# Patient Record
Sex: FEMALE | Race: WHITE | NOT HISPANIC OR LATINO | Employment: OTHER | URBAN - METROPOLITAN AREA
[De-identification: names, ages, dates, MRNs, and addresses within clinical notes are randomized per-mention and may not be internally consistent; named-entity substitution may affect disease eponyms.]

---

## 2018-03-06 PROBLEM — Z86.0100 HISTORY OF COLON POLYPS: Status: ACTIVE | Noted: 2018-03-06

## 2018-03-06 PROBLEM — Z86.010 HISTORY OF COLON POLYPS: Status: ACTIVE | Noted: 2018-03-06

## 2018-09-18 PROBLEM — K63.5 COLON POLYP: Status: ACTIVE | Noted: 2018-09-18

## 2020-02-26 DIAGNOSIS — Z12.31 SCREENING MAMMOGRAM FOR HIGH-RISK PATIENT: Primary | ICD-10-CM

## 2020-03-10 ENCOUNTER — HOSPITAL ENCOUNTER (OUTPATIENT)
Dept: RADIOLOGY | Facility: HOSPITAL | Age: 80
Discharge: HOME | End: 2020-03-10
Attending: OBSTETRICS & GYNECOLOGY
Payer: MEDICARE

## 2020-03-10 VITALS — WEIGHT: 220.25 LBS | HEIGHT: 60 IN | BODY MASS INDEX: 43.24 KG/M2

## 2020-03-10 DIAGNOSIS — Z12.31 SCREENING MAMMOGRAM FOR HIGH-RISK PATIENT: ICD-10-CM

## 2020-03-10 PROCEDURE — 77067 SCR MAMMO BI INCL CAD: CPT | Mod: TC,PO

## 2021-02-10 DIAGNOSIS — K21.9 GASTROESOPHAGEAL REFLUX DISEASE WITHOUT ESOPHAGITIS: Primary | ICD-10-CM

## 2021-02-10 RX ORDER — OMEPRAZOLE 40 MG/1
40 CAPSULE, DELAYED RELEASE ORAL DAILY
Qty: 90 CAPSULE | Refills: 3 | Status: SHIPPED | OUTPATIENT
Start: 2021-02-10 | End: 2022-01-20

## 2021-05-06 ENCOUNTER — OFFICE VISIT (OUTPATIENT)
Dept: FAMILY MEDICINE | Facility: CLINIC | Age: 81
End: 2021-05-06
Payer: MEDICARE

## 2021-05-06 VITALS
HEART RATE: 60 BPM | BODY MASS INDEX: 43.19 KG/M2 | RESPIRATION RATE: 16 BRPM | DIASTOLIC BLOOD PRESSURE: 68 MMHG | TEMPERATURE: 98 F | OXYGEN SATURATION: 96 % | WEIGHT: 220 LBS | HEIGHT: 60 IN | SYSTOLIC BLOOD PRESSURE: 126 MMHG

## 2021-05-06 DIAGNOSIS — H40.9 GLAUCOMA OF BOTH EYES, UNSPECIFIED GLAUCOMA TYPE: ICD-10-CM

## 2021-05-06 DIAGNOSIS — H81.11 BENIGN PAROXYSMAL POSITIONAL VERTIGO OF RIGHT EAR: Primary | ICD-10-CM

## 2021-05-06 DIAGNOSIS — R35.89 POLYURIA: ICD-10-CM

## 2021-05-06 DIAGNOSIS — I10 ESSENTIAL HYPERTENSION: ICD-10-CM

## 2021-05-06 DIAGNOSIS — G47.33 OSA (OBSTRUCTIVE SLEEP APNEA): ICD-10-CM

## 2021-05-06 DIAGNOSIS — R00.1 BRADYCARDIA: ICD-10-CM

## 2021-05-06 DIAGNOSIS — H69.91 EUSTACHIAN TUBE DYSFUNCTION, RIGHT: ICD-10-CM

## 2021-05-06 LAB
BILIRUB SERPL-MCNC: NEGATIVE MG/DL
BLOOD URINE, POC: NEGATIVE
CLARITY, POC UA: ABNORMAL
COLOR, POC UA: YELLOW
GLUCOSE UR QL STRIP: NORMAL
KETONES UR QL STRIP: NEGATIVE
LEUKOCYTE ESTERASE URINE, POC: ABNORMAL
NITRITE, POC UA: NEGATIVE
PH, POC UA: 7
PROTEIN, POC: NEGATIVE
SPECIFIC GRAVITY, POC UA: 1
UROBILINOGEN, POC UA: NEGATIVE

## 2021-05-06 PROCEDURE — 99213 PR OFFICE/OUTPT VISIT, EST, LEVL III, 20-29 MIN: ICD-10-PCS | Mod: 25,S$GLB,ICN, | Performed by: INTERNAL MEDICINE

## 2021-05-06 PROCEDURE — 99213 OFFICE O/P EST LOW 20 MIN: CPT | Mod: 25,S$GLB,ICN, | Performed by: INTERNAL MEDICINE

## 2021-05-06 PROCEDURE — 81002 URINALYSIS NONAUTO W/O SCOPE: CPT | Mod: S$GLB,ICN,, | Performed by: INTERNAL MEDICINE

## 2021-05-06 PROCEDURE — 81002 POCT URINE DIPSTICK WITHOUT MICROSCOPE: ICD-10-PCS | Mod: S$GLB,ICN,, | Performed by: INTERNAL MEDICINE

## 2021-05-06 RX ORDER — BIMATOPROST 0.1 MG/ML
SOLUTION/ DROPS OPHTHALMIC
COMMUNITY
Start: 2021-02-03

## 2021-05-06 RX ORDER — METOPROLOL SUCCINATE 50 MG/1
50 TABLET, EXTENDED RELEASE ORAL NIGHTLY
Qty: 90 TABLET | Refills: 3 | Status: SHIPPED | OUTPATIENT
Start: 2021-05-06

## 2021-05-06 RX ORDER — VALSARTAN AND HYDROCHLOROTHIAZIDE 320; 25 MG/1; MG/1
1 TABLET, FILM COATED ORAL DAILY
COMMUNITY
End: 2023-02-28 | Stop reason: SDUPTHER

## 2022-07-11 LAB
CHOLEST SERPL-MSCNC: 172 MG/DL (ref 0–200)
HDLC SERPL-MCNC: 48 MG/DL
LDLC SERPL CALC-MCNC: 97 MG/DL (ref 0–160)
TRIGL SERPL-MCNC: 134 MG/DL (ref 40–160)

## 2022-08-04 ENCOUNTER — TELEPHONE (OUTPATIENT)
Dept: FAMILY MEDICINE | Facility: CLINIC | Age: 82
End: 2022-08-04
Payer: MEDICARE

## 2022-08-15 ENCOUNTER — OFFICE VISIT (OUTPATIENT)
Dept: FAMILY MEDICINE | Facility: CLINIC | Age: 82
End: 2022-08-15
Payer: MEDICARE

## 2022-08-15 VITALS
OXYGEN SATURATION: 95 % | SYSTOLIC BLOOD PRESSURE: 134 MMHG | BODY MASS INDEX: 44.76 KG/M2 | HEART RATE: 59 BPM | DIASTOLIC BLOOD PRESSURE: 70 MMHG | HEIGHT: 60 IN | WEIGHT: 228 LBS | TEMPERATURE: 99 F

## 2022-08-15 DIAGNOSIS — H90.3 SENSORINEURAL HEARING LOSS (SNHL) OF BOTH EARS: ICD-10-CM

## 2022-08-15 DIAGNOSIS — Z12.31 SCREENING MAMMOGRAM FOR HIGH-RISK PATIENT: ICD-10-CM

## 2022-08-15 DIAGNOSIS — E03.9 HYPOTHYROIDISM, UNSPECIFIED TYPE: Primary | ICD-10-CM

## 2022-08-15 DIAGNOSIS — G47.33 OSA (OBSTRUCTIVE SLEEP APNEA): ICD-10-CM

## 2022-08-15 DIAGNOSIS — I10 ESSENTIAL HYPERTENSION: ICD-10-CM

## 2022-08-15 DIAGNOSIS — K21.9 GASTROESOPHAGEAL REFLUX DISEASE WITHOUT ESOPHAGITIS: ICD-10-CM

## 2022-08-15 DIAGNOSIS — R00.1 BRADYCARDIA: ICD-10-CM

## 2022-08-15 DIAGNOSIS — H40.9 GLAUCOMA OF RIGHT EYE, UNSPECIFIED GLAUCOMA TYPE: ICD-10-CM

## 2022-08-15 PROCEDURE — 99215 OFFICE O/P EST HI 40 MIN: CPT | Mod: S$GLB,,, | Performed by: INTERNAL MEDICINE

## 2022-08-15 PROCEDURE — 99215 PR OFFICE/OUTPT VISIT, EST, LEVL V, 40-54 MIN: ICD-10-PCS | Mod: S$GLB,,, | Performed by: INTERNAL MEDICINE

## 2022-08-15 RX ORDER — OMEPRAZOLE 40 MG/1
40 CAPSULE, DELAYED RELEASE ORAL DAILY
Qty: 90 CAPSULE | Refills: 3 | Status: SHIPPED | OUTPATIENT
Start: 2022-08-15

## 2022-08-15 RX ORDER — DOXYCYCLINE 100 MG/1
100 CAPSULE ORAL 2 TIMES DAILY
COMMUNITY
Start: 2022-08-04 | End: 2022-08-15

## 2022-08-15 RX ORDER — KETOROLAC TROMETHAMINE 5 MG/ML
SOLUTION OPHTHALMIC
COMMUNITY
Start: 2022-02-22 | End: 2023-02-28

## 2022-08-15 RX ORDER — LEVOTHYROXINE SODIUM 88 UG/1
TABLET ORAL
Qty: 90 TABLET | Refills: 3 | Status: SHIPPED | OUTPATIENT
Start: 2022-08-15 | End: 2023-02-28 | Stop reason: SDUPTHER

## 2022-08-15 NOTE — Clinical Note
Patient had her lipid panel by her cardiologist Dr. Becerra on July 11th 2022 at [facility]

## 2022-08-15 NOTE — PROGRESS NOTES
Subjective:       Patient ID: Rose Vasquez is a 81 y.o. female.    Chief Complaint: Follow-up (Patient is here for a follow up and had labs performed on 7/11/22 with [facility]; Available for review per pt request from the office of Dr. Becerra. Pt has not been sleeping at night and CPAP has had a recall on it. Pt feels she is dozing off during the day and not resting during the night and would like advisement on a new order and procedure going forward. ) and PES - Care Gap (Pt states she had a shingles vaccine a little over a year ago; TDAP is unknown and Pneumococcal Vaccine is due. )    Patient presents today in the company of her daughter for review of lab work ordered by her cardiologist Dr. Becerra on July 11, 2022. Reports are available at time of exam will be discussed with the patient at length.    Regarding her CBC was unremarkable with a white count of 5100 hemoglobin of 13.9 hematocrit of 40.6 MCV of 92 and 179,000 platelets.    Thyroid function testing was also performed with a TSH of 3.76 with a free T4 of 1.28.  These are both normal values.  Patient is compliant and tolerating her Synthroid 88 mcg 1 every day.  She gets these male through the try care prescription program and there is no reason to change it at this time.  This lab work can be repeated in 1 year    Under comprehensive metabolic panel sodium was normal at 139 potassium 3.7 chloride 100 bicarb of 31.  BUN of 14 creatinine 0.75 gives her calculated glomerular filtration rate for non- Americ over 60 cc/minute.  Fast Glucose was 105 with a calcium of 10.2 and albumin of 4.0.  This is elevated by just 0.2 and is not considered to be clinically significant.  Liver function testing AST and ALT were equal to less than 20 with the total bilirubin of 0.5 an alkaline phosphatase of 86.    Fasting total cholesterol was 172 with an HDL 48 LDL of 97 and triglycerides of 134. Patient does not warrant starting statin therapy for lifetime at this  time.    Magnesium level was unremarkable at 2.1 with a high normal of 2.3 low normal 1.6.    Regarding the care gaps hepatitis-C screening can be obtained at a later date.  Patient has had 2 doses of the COVID vaccine and no booster as of now.  She is interested in receiving the seasonal influenza vaccination when available and have instructed her to call us in the next 3-4 weeks to be able to be placed on the schedule.    Patient's CPAP equipment for his sleep apnea is part of the recall and unfortunately they have not being able to catch up with the demand for the filters.  Being that her sleep study was many years ago patient warrants a restudy and readjustment of the pressures.  I will refer her back to Dr. Lubin for further evaluation and therapy.  They will call her from his office for the final scheduling details.    She is up-to-date with follow-up for her glaucoma therapy.  This is affecting her right eye only at this time.  Medication was modified per my recommendation to discontinue the Timoptic being that she was also on oral beta-blocker.  She is still having heart rates in the mid 50s to low 60s.  Patient is asymptomatic from this.        Review of Systems   All other systems reviewed and are negative.        Objective:      Physical Exam  Vitals and nursing note reviewed. Exam conducted with a chaperone present.   Constitutional:       General: She is not in acute distress.     Appearance: Normal appearance. She is obese. She is not ill-appearing, toxic-appearing or diaphoretic.   HENT:      Head: Normocephalic and atraumatic.   Cardiovascular:      Rate and Rhythm: Regular rhythm. Bradycardia present.      Pulses: Normal pulses.      Heart sounds: Normal heart sounds. No murmur heard.  Pulmonary:      Effort: Pulmonary effort is normal.      Breath sounds: Normal breath sounds.   Skin:     General: Skin is warm and dry.      Coloration: Skin is not jaundiced or pale.   Neurological:      General:  No focal deficit present.      Mental Status: She is alert and oriented to person, place, and time. Mental status is at baseline.      Cranial Nerves: No cranial nerve deficit.      Sensory: Sensory deficit present.      Motor: No weakness.      Gait: Gait normal.      Comments: Bilateral hearing aids.  Sensorineural hearing loss.   Psychiatric:         Mood and Affect: Mood normal.         Behavior: Behavior normal.         Thought Content: Thought content normal.         Judgment: Judgment normal.         Assessment:       Problem List Items Addressed This Visit    None     Visit Diagnoses     Hypothyroidism, unspecified type    -  Primary    Gastroesophageal reflux disease without esophagitis        ANGELA (obstructive sleep apnea)        Glaucoma of right eye, unspecified glaucoma type        Essential hypertension        Bradycardia        Sensorineural hearing loss (SNHL) of both ears              Plan:       Based on this lab work from July 11, 2022 patient does not have any pressing medical issues.  She is to continue follow-up with Cardiology as now.    She can get a 2nd opinion regarding the function of her hearing aids which she is not satisfied with.    Patient is to call the office in the next 3-4 weeks regarding the availability of the seasonal influenza vaccination.    At the time of the next lab work she can have hepatitis-C screen.    Patient is to continue close follow-up with her optometrist regarding her glaucoma management.  She is still having some bradycardia most probably related now just to the Toprol and not to the combination of the Toprol and the team optic.    I recommended for the daughter to use the patient's cellphone to take pictures of all the medications including her eye drops so that she can have it available at time of visit.    Other care gaps are satisfied include the lipid panel that was already performed in July.    No vaccines are otherwise pending and is up to the patient to  receive COVID booster is or not.    Patient will be scheduled for mammography at the [facility] and they will call with regards to final scheduling details.    Pt is well aware of the signs and symptoms to watch for and to seek medical attention in case these appear

## 2022-08-17 DIAGNOSIS — I10 ESSENTIAL HYPERTENSION: ICD-10-CM

## 2022-08-18 ENCOUNTER — PATIENT OUTREACH (OUTPATIENT)
Dept: ADMINISTRATIVE | Facility: HOSPITAL | Age: 82
End: 2022-08-18
Payer: MEDICARE

## 2022-09-09 ENCOUNTER — HOSPITAL ENCOUNTER (OUTPATIENT)
Dept: RADIOLOGY | Facility: HOSPITAL | Age: 82
Discharge: HOME | End: 2022-09-09
Attending: INTERNAL MEDICINE
Payer: MEDICARE

## 2022-09-09 VITALS — BODY MASS INDEX: 44.75 KG/M2 | HEIGHT: 60 IN | WEIGHT: 227.94 LBS

## 2022-09-09 DIAGNOSIS — Z12.31 SCREENING MAMMOGRAM FOR HIGH-RISK PATIENT: ICD-10-CM

## 2022-09-09 PROCEDURE — 77067 SCR MAMMO BI INCL CAD: CPT | Mod: TC,PO

## 2022-11-30 ENCOUNTER — CLINICAL SUPPORT (OUTPATIENT)
Dept: FAMILY MEDICINE | Facility: CLINIC | Age: 82
End: 2022-11-30
Payer: MEDICARE

## 2022-11-30 DIAGNOSIS — Z23 FLU VACCINE NEED: Primary | ICD-10-CM

## 2022-11-30 PROCEDURE — 90686 IIV4 VACC NO PRSV 0.5 ML IM: CPT | Mod: S$GLB,,, | Performed by: INTERNAL MEDICINE

## 2022-11-30 PROCEDURE — G0008 ADMIN INFLUENZA VIRUS VAC: HCPCS | Mod: S$GLB,,, | Performed by: INTERNAL MEDICINE

## 2022-11-30 PROCEDURE — G0008 FLU VACCINE (QUAD) GREATER THAN OR EQUAL TO 3YO PRESERVATIVE FREE IM: ICD-10-PCS | Mod: S$GLB,,, | Performed by: INTERNAL MEDICINE

## 2022-11-30 PROCEDURE — 90686 FLU VACCINE (QUAD) GREATER THAN OR EQUAL TO 3YO PRESERVATIVE FREE IM: ICD-10-PCS | Mod: S$GLB,,, | Performed by: INTERNAL MEDICINE

## 2022-11-30 NOTE — PROGRESS NOTES
Patient seen today as a nurse visit for flu vaccine. Injection given IM to left deltoid.  Patient tolerated procedure well. YOVANI Mcgarry

## 2023-02-28 ENCOUNTER — OFFICE VISIT (OUTPATIENT)
Dept: FAMILY MEDICINE | Facility: CLINIC | Age: 83
End: 2023-02-28
Payer: MEDICARE

## 2023-02-28 VITALS
HEIGHT: 60 IN | DIASTOLIC BLOOD PRESSURE: 80 MMHG | OXYGEN SATURATION: 96 % | SYSTOLIC BLOOD PRESSURE: 128 MMHG | BODY MASS INDEX: 44.15 KG/M2 | HEART RATE: 72 BPM | WEIGHT: 224.88 LBS | RESPIRATION RATE: 18 BRPM

## 2023-02-28 DIAGNOSIS — G47.33 OSA (OBSTRUCTIVE SLEEP APNEA): ICD-10-CM

## 2023-02-28 DIAGNOSIS — H40.9 GLAUCOMA OF BOTH EYES, UNSPECIFIED GLAUCOMA TYPE: ICD-10-CM

## 2023-02-28 DIAGNOSIS — E03.9 HYPOTHYROIDISM, UNSPECIFIED TYPE: ICD-10-CM

## 2023-02-28 DIAGNOSIS — I10 ESSENTIAL HYPERTENSION: Primary | ICD-10-CM

## 2023-02-28 DIAGNOSIS — E66.2 CLASS 3 OBESITY WITH ALVEOLAR HYPOVENTILATION, SERIOUS COMORBIDITY, AND BODY MASS INDEX (BMI) OF 40.0 TO 44.9 IN ADULT: ICD-10-CM

## 2023-02-28 DIAGNOSIS — H81.11 BENIGN PAROXYSMAL POSITIONAL VERTIGO OF RIGHT EAR: ICD-10-CM

## 2023-02-28 DIAGNOSIS — R79.89 AZOTEMIA: ICD-10-CM

## 2023-02-28 LAB
ANION GAP SERPL CALC-SCNC: 11 MMOL/L (ref 8–16)
BUN SERPL-MCNC: 15 MG/DL (ref 8–23)
CALCIUM SERPL-MCNC: 9.4 MG/DL (ref 8.7–10.5)
CHLORIDE SERPL-SCNC: 104 MMOL/L (ref 95–110)
CO2 SERPL-SCNC: 27 MMOL/L (ref 23–29)
CREAT SERPL-MCNC: 0.8 MG/DL (ref 0.5–1.4)
EST. GFR  (NO RACE VARIABLE): >60 ML/MIN/1.73 M^2
GLUCOSE SERPL-MCNC: 83 MG/DL (ref 70–110)
POTASSIUM SERPL-SCNC: 4 MMOL/L (ref 3.5–5.1)
SODIUM SERPL-SCNC: 142 MMOL/L (ref 136–145)

## 2023-02-28 PROCEDURE — 99213 OFFICE O/P EST LOW 20 MIN: CPT | Mod: S$GLB,,, | Performed by: INTERNAL MEDICINE

## 2023-02-28 PROCEDURE — 80048 BASIC METABOLIC PNL TOTAL CA: CPT | Performed by: INTERNAL MEDICINE

## 2023-02-28 PROCEDURE — 99213 PR OFFICE/OUTPT VISIT, EST, LEVL III, 20-29 MIN: ICD-10-PCS | Mod: S$GLB,,, | Performed by: INTERNAL MEDICINE

## 2023-02-28 RX ORDER — VALSARTAN AND HYDROCHLOROTHIAZIDE 320; 25 MG/1; MG/1
1 TABLET, FILM COATED ORAL DAILY
Qty: 90 TABLET | Refills: 3 | Status: SHIPPED | OUTPATIENT
Start: 2023-02-28

## 2023-02-28 RX ORDER — LEVOTHYROXINE SODIUM 88 UG/1
TABLET ORAL
Qty: 90 TABLET | Refills: 3 | Status: SHIPPED | OUTPATIENT
Start: 2023-02-28

## 2023-02-28 NOTE — PROGRESS NOTES
Subjective:       Patient ID: Rose Vasquez is a 82 y.o. female.    Chief Complaint: Follow-up (Pt presents to the clinic for a medication refill and 5m f/u)    Presents in the company of her daughter who is the primary caregiver for follow-up of her hypertension medication refills.    She is currently on valsartan HCTZ 320-25 with appropriate blood pressure control.  As are for this to be refilled good for 90 day supply with 3 refills.      Also her levothyroxine at 88 mcg every day had a normal TSH back in July of 2022 so he also will be refilled good for a year.      She continues treatment for glaucoma and is currently only on Lumigan.  Intra-ocular pressures are appropriate at the time of the last checkup.      Patient prefers not to have another DEXA scan.      No other care gaps pending.      Pressure today is appropriate 128/80 and she is in normal sinus rhythm in the mid 70s.      Regarding an issue on her waking up from a nap on a recliner without wearing her CPAP and feeling that she had difficulty waking up, is not for patient to have severe obstructive sleep apnea and sleeping without the CPAP my actually have had something to do with that.  I recommended for her not to nap on a recliner and only do so on a bed and always with the CPAP on.      She has already undergone a complete cardiology workup and there were no abnormalities identified at the time.  This included a stress test and an echocardiogram.  I surmise that there was no changes suggestive for critical coronary stenosis or changes of valvular heart disease of pulmonary hypertension.      As it pertains to the poor stamina it appears to be more related to deconditioning than to any active medical illness.    Review of Systems   All other systems reviewed and are negative.      Objective:      Physical Exam  Vitals and nursing note reviewed. Exam conducted with a chaperone present.   Constitutional:       Appearance: Normal appearance. She  is obese.   Cardiovascular:      Rate and Rhythm: Normal rate and regular rhythm.      Pulses: Normal pulses.      Heart sounds: Normal heart sounds. No murmur heard.    No gallop.   Pulmonary:      Effort: Pulmonary effort is normal. No respiratory distress.      Breath sounds: Normal breath sounds. No wheezing.   Chest:      Chest wall: No tenderness.   Skin:     General: Skin is warm and dry.      Coloration: Skin is not jaundiced or pale.   Neurological:      General: No focal deficit present.      Mental Status: She is alert and oriented to person, place, and time. Mental status is at baseline.      Cranial Nerves: No cranial nerve deficit.      Sensory: No sensory deficit.      Motor: No weakness.      Coordination: Coordination normal.      Gait: Gait normal.   Psychiatric:         Mood and Affect: Mood normal.         Behavior: Behavior normal.         Thought Content: Thought content normal.         Judgment: Judgment normal.       Assessment:       Problem List Items Addressed This Visit    None  Visit Diagnoses       Essential hypertension    -  Primary    Hypothyroidism, unspecified type        Azotemia        ANGELA (obstructive sleep apnea)        Benign paroxysmal positional vertigo of right ear        Glaucoma of both eyes, unspecified glaucoma type        Class 3 obesity with alveolar hypoventilation, serious comorbidity, and body mass index (BMI) of 40.0 to 44.9 in adult                  Plan:       Patient's valsartan hydrochlorothiazide 320-25 will be replenished now.  Be good for a year.  Blood pressure is controlled with current therapy.      Her hypothyroidism management with the levothyroxine 88 mcg every day will also be refilled good for a year.      Basic metabolic panel is to be drawn today to follow-up on electrolytes and renal function since he has not been done since July of 2022.      She is up-to-date with follow-up of hypothyroidism with normal TSH in July of 2022.      Glaucoma is  controlled with monotherapy with Lumigan according to the patient.      The daughter is to set of the portal in the way that she can get all the patient's messages and have access to her privileged medical information with the patient's permission.

## 2023-10-12 DIAGNOSIS — Z12.31 ENCOUNTER FOR SCREENING MAMMOGRAM FOR MALIGNANT NEOPLASM OF BREAST: Primary | ICD-10-CM

## 2023-12-04 ENCOUNTER — HOSPITAL ENCOUNTER (OUTPATIENT)
Dept: RADIOLOGY | Facility: HOSPITAL | Age: 83
Discharge: HOME | End: 2023-12-04
Attending: INTERNAL MEDICINE
Payer: MEDICARE

## 2023-12-04 DIAGNOSIS — Z12.31 ENCOUNTER FOR SCREENING MAMMOGRAM FOR MALIGNANT NEOPLASM OF BREAST: ICD-10-CM

## 2023-12-04 PROCEDURE — 77067 SCR MAMMO BI INCL CAD: CPT | Mod: TC,PO

## 2023-12-29 ENCOUNTER — OFFICE VISIT (OUTPATIENT)
Dept: HEMATOLOGY/ONCOLOGY | Facility: CLINIC | Age: 83
End: 2023-12-29
Payer: MEDICARE

## 2023-12-29 VITALS
WEIGHT: 220.88 LBS | DIASTOLIC BLOOD PRESSURE: 78 MMHG | HEART RATE: 59 BPM | SYSTOLIC BLOOD PRESSURE: 131 MMHG | RESPIRATION RATE: 16 BRPM | TEMPERATURE: 97 F | BODY MASS INDEX: 43.14 KG/M2

## 2023-12-29 DIAGNOSIS — I82.401 LEG DVT (DEEP VENOUS THROMBOEMBOLISM), ACUTE, RIGHT: ICD-10-CM

## 2023-12-29 DIAGNOSIS — I26.09 OTHER ACUTE PULMONARY EMBOLISM WITH ACUTE COR PULMONALE: Primary | ICD-10-CM

## 2023-12-29 PROCEDURE — 99204 OFFICE O/P NEW MOD 45 MIN: CPT | Mod: S$GLB,,, | Performed by: INTERNAL MEDICINE

## 2023-12-29 PROCEDURE — 99204 PR OFFICE/OUTPT VISIT, NEW, LEVL IV, 45-59 MIN: ICD-10-PCS | Mod: S$GLB,,, | Performed by: INTERNAL MEDICINE

## 2023-12-29 RX ORDER — APIXABAN 5 MG/1
5 TABLET, FILM COATED ORAL 2 TIMES DAILY
COMMUNITY
Start: 2023-12-04

## 2023-12-31 NOTE — PROGRESS NOTES
[facility] hematology Oncology in office initial encounter note     2023    Subjective:      Patient ID:   Rose Vasquez  83 y.o. female  1940  MD Josephine Roldan NP      Chief Complaint:   Pulmonary embolus     HPI:  83 y.o. female traveled with her family by automobile in 2022 you very for vacation in [place].  Around late November, she developed pain and swelling in her right leg for approximately 1 week.  She was seen in the emergency room at [facility] and found to have DVT at the right leg and multiple pulmonary emboli.  She was transferred to [facility], treated with heparin infusion and transitioned over to Eliquis.  She is presently on Eliquis 5 mg b.i.d..  She denies shortness of breath or chest pain symptoms or pleurisy.  She does have some residual pain and swelling in the right leg now.    She does have history of taking COVID vaccinations in , and had COVID infection approximately 1 year ago.    She has glaucoma, hypertension x2 years, sleep apnea syndrome and uses a CPAP machine, she has thyroid disease.    Medications include Synthroid Toprol-XL, Prilosec, Diovan HCT and vitamin-D, vitamin-C    She is allergic to methylprednisolone and or lidocaine.    She does not smoke, she does not drink alcohol.    She is status post left rotator cuff surgery at the shoulder, and status post cholecystectomy.    She is a  4 para 3 miscarriage 1 individual.    Her mother had colon cancer, CVA x1, she had 1 kidney removed.  Her father had lung cancer, diabetes, he was a mattson.  A brother  of unknown cause, another brother had diabetes, hypertension, renal disease.  She had 2 sisters alive and well.  She has 2 daughters alive and well and 1 son with testicular cancer history at age 29 treated with surgery and radiation.  There is no family history of DVT, or pulmonary emboli events.    ROS:   GEN: normal without any fever, night  sweats or weight loss  HEENT: normal with no HA's, sore throat, stiff neck, changes in vision  CV: normal with no CP, SOB, PND, TOURE or orthopnea  PULM: See HPI  GI: normal with no abdominal pain, nausea, vomiting, constipation, diarrhea, melanotic stools, BRBPR, or hematemesis  : normal with no hematuria, dysuria  BREAST: normal with no mass, discharge, pain  SKIN: normal with no rash, erythema, bruising, or swelling     Past Medical History:   Diagnosis Date    Glaucoma     BL    Hypertension     Sleep apnea with use of continuous positive airway pressure (CPAP)     Thyroid disease      Past Surgical History:   Procedure Laterality Date    BREAST SURGERY      lump removed     CHOLECYSTECTOMY      COLONOSCOPY N/A 3/6/2018    Procedure: COLONOSCOPY;  Surgeon: Gamal Shelley MD;  Location: [facility];  Service: Endoscopy;  Laterality: N/A;    COLONOSCOPY N/A 9/18/2018    Procedure: COLONOSCOPY;  Surgeon: Gamal Shelley MD;  Location: [facility];  Service: Endoscopy;  Laterality: N/A;    COLONOSCOPY N/A 9/3/2019    Procedure: COLONOSCOPY;  Surgeon: Gamal Shelley MD;  Location: [facility];  Service: Endoscopy;  Laterality: N/A;    ENDOSCOPIC MUCOSAL RESECTION OF COLON N/A 9/18/2018    Procedure: RESECTION, MUCOSA, COLON, ENDOSCOPIC;  Surgeon: Gamal Shelley MD;  Location: [facility];  Service: Endoscopy;  Laterality: N/A;  CF SCOPE WITH CLEAR CAP    ENDOSCOPIC MUCOSAL RESECTION OF COLON N/A 9/3/2019    Procedure: RESECTION, MUCOSA, COLON, ENDOSCOPIC;  Surgeon: Gamal Shelley MD;  Location: [facility];  Service: Endoscopy;  Laterality: N/A;  CF with clear cap       Review of patient's allergies indicates:   Allergen Reactions    Depo-medrol [methylprednisolone acetate] Other (See Comments)     Swelling in throat after mixture of dep-medrol and lidocaine IM injection    Lidocaine Other (See Comments)     Swelling in throat after mixture of dep-medrol and lidocaine IM injection     Methylprednisolone Other (See Comments)     Swelling in throat after mixture of dep-medrol and lidocaine IM injection     Social History     Socioeconomic History    Marital status:    Tobacco Use    Smoking status: Never    Smokeless tobacco: Never   Substance and Sexual Activity    Alcohol use: No         Current Outpatient Medications:     ascorbic acid, vitamin C, 550 mg/1.1 gram (scoop) Powd, Take by mouth., Disp: , Rfl:     bimatoprost (LUMIGAN) 0.01 % Drop, Apply 1 drop to eye every evening., Disp: , Rfl:     ELIQUIS 5 mg Tab, Take 5 mg by mouth 2 (two) times a day., Disp: , Rfl:     ergocalciferol (ERGOCALCIFEROL) 50,000 unit Cap, Take 2,000 Units by mouth every 7 days., Disp: , Rfl:     ergocalciferol (ERGOCALCIFEROL) 50,000 unit Cap, Take 50,000 Units by mouth., Disp: , Rfl:     levothyroxine (SYNTHROID) 88 MCG tablet, TAKE 1 TABLET EVERY MORNING ONE HOUR BEFORE MEAL AND OTHER MEDICATIONS., Disp: 90 tablet, Rfl: 3    LUMIGAN 0.01 % Drop, , Disp: , Rfl:     metoprolol succinate (TOPROL-XL) 50 MG 24 hr tablet, Take 1 tablet (50 mg total) by mouth every evening., Disp: 90 tablet, Rfl: 3    omeprazole (PRILOSEC) 40 MG capsule, Take 1 capsule (40 mg total) by mouth once daily., Disp: 90 capsule, Rfl: 3    valsartan-hydrochlorothiazide (DIOVAN-HCT) 320-25 mg per tablet, Take 1 tablet by mouth once daily., Disp: 90 tablet, Rfl: 3          Objective:   Vitals:  Blood pressure 131/78, pulse (!) 59, temperature 97.2 °F (36.2 °C), resp. rate 16, weight 100.2 kg (220 lb 14.4 oz).    Physical Examination:   GEN: no apparent distress, comfortable  HEAD: atraumatic and normocephalic  EYES: no pallor, no icterus  ENT: no pharyngeal erythema, external ears WNL; no nasal discharge  NECK: no masses, thyroid normal, trachea midline, no LAD/LN's, supple  CV: RRR with no murmur; normal pulse  CHEST: Normal respiratory effort; CTAB; normal breath sounds; no wheeze or crackles  ABDOM: nontender and nondistended; soft;  no  "rebound/guarding, L/S NP  MUSC/Skeletal: ROM normal; no crepitus; joints normal  EXTREM:  Right leg is nontender without palpable venous cord but does have 1+ pitting edema.  Left leg is nontender without palpable venous cord, without pitting edema.  Venous stasis changes not seen at the left or right leg.  SKIN: no rashes, lesions, ulcers, petechiae   : no CVAT  NEURO: grossly intact; motor/sensory WNL;  no tremors  PSYCH: normal mood, affect and behavior  LYMPH: normal cervical, supraclavicular, axillary LN's  BREASTS:she left bra on for exam.      Labs:   No results found for: "WBC", "HGB", "HCT", "MCV", "PLT" CMP  Sodium   Date Value Ref Range Status   02/28/2023 142 136 - 145 mmol/L Final     Potassium   Date Value Ref Range Status   02/28/2023 4.0 3.5 - 5.1 mmol/L Final     Chloride   Date Value Ref Range Status   02/28/2023 104 95 - 110 mmol/L Final     CO2   Date Value Ref Range Status   02/28/2023 27 23 - 29 mmol/L Final     Glucose   Date Value Ref Range Status   02/28/2023 83 70 - 110 mg/dL Final     BUN   Date Value Ref Range Status   02/28/2023 15 8 - 23 mg/dL Final     Creatinine   Date Value Ref Range Status   02/28/2023 0.8 0.5 - 1.4 mg/dL Final     Calcium   Date Value Ref Range Status   02/28/2023 9.4 8.7 - 10.5 mg/dL Final     Anion Gap   Date Value Ref Range Status   02/28/2023 11 8 - 16 mmol/L Final     Recent CBC, CMP, TSH unremarkable.    12/4/23 mamm negative for malignancy    CTA multiple bilateral pulmonary emboli with right heart strain.    U/S thrombosis seen in superficial femoral vein and popliteal vein of the right leg, November 16, 2023    Assessment:   (1) 83 y.o. female with diagnosis of right leg DVT and bilateral multiple pulmonary emboli with right heart strain.  Presently clinically improved on Eliquis 5 mg p.o. b.i.d..  She will continue Eliquis for 4-6 months.  She will follow-up here in 3 months.  CT scan of the chest and ultrasound of the right leg will be done in 4 months " to check on the resolution of clot.    (2) At some point in the future, after pulmonary emboli have resolved and after right leg DVT has resolved, the Eliquis will be interrupted and lab studies to check for possible hypercoagulation syndrome will be done.  At that time depending on results, we will make a decision on stopping or continuing Eliquis as prophylaxis for DVT.    Return to clinic here in 3 months.

## 2024-04-01 ENCOUNTER — OFFICE VISIT (OUTPATIENT)
Dept: HEMATOLOGY/ONCOLOGY | Facility: CLINIC | Age: 84
End: 2024-04-01
Payer: MEDICARE

## 2024-04-01 VITALS
TEMPERATURE: 98 F | DIASTOLIC BLOOD PRESSURE: 79 MMHG | RESPIRATION RATE: 16 BRPM | WEIGHT: 236.38 LBS | HEART RATE: 63 BPM | SYSTOLIC BLOOD PRESSURE: 140 MMHG | BODY MASS INDEX: 46.17 KG/M2

## 2024-04-01 DIAGNOSIS — I26.99 ACUTE PULMONARY EMBOLISM WITHOUT ACUTE COR PULMONALE, UNSPECIFIED PULMONARY EMBOLISM TYPE: Primary | ICD-10-CM

## 2024-04-01 DIAGNOSIS — I82.411 ACUTE DEEP VEIN THROMBOSIS (DVT) OF FEMORAL VEIN OF RIGHT LOWER EXTREMITY: ICD-10-CM

## 2024-04-01 PROCEDURE — 99214 OFFICE O/P EST MOD 30 MIN: CPT | Mod: S$GLB,,, | Performed by: INTERNAL MEDICINE

## 2024-04-01 NOTE — PROGRESS NOTES
[facility] hematology Oncology in office Subsequent encounter note     24    Subjective:      Patient ID:   Rose Vasquez  83 y.o. female  1940  MD Josephine Roldan NP      Chief Complaint:   Pulmonary embolus     HPI:  83 y.o. female traveled with her family by automobile in 2023 you very for vacation in [place].  Around 23, she developed pain and swelling in her right leg for approximately 1 week.  She was seen in the emergency room at [facility] and found to have DVT at the right leg and multiple pulmonary emboli.  She was transferred to [facility], treated with heparin infusion and transitioned over to Eliquis.  She is presently on Eliquis 5 mg b.i.d..  She denies shortness of breath or chest pain symptoms or pleurisy.  She does have some residual pain and swelling in the right leg now and then.    She does have history of taking COVID vaccinations in , and had COVID infection approximately 1 year ago.    She has glaucoma, hypertension x2 years, sleep apnea syndrome and uses a CPAP machine, she has thyroid disease.    Medications include Synthroid Toprol-XL, Prilosec, Diovan HCT and vitamin-D, vitamin-C    She is allergic to methylprednisolone and or lidocaine.    She does not smoke, she does not drink alcohol.    She is status post left rotator cuff surgery at the shoulder, and status post cholecystectomy.    She is a  4 para 3 miscarriage 1 individual.    Her mother had colon cancer, CVA x1, she had 1 kidney removed.  Her father had lung cancer, diabetes, he was a mattson.  A brother  of unknown cause, another brother had diabetes, hypertension, renal disease.  She had 2 sisters alive and well.  She has 2 daughters alive and well and 1 son with testicular cancer history at age 29 treated with surgery and radiation.  There is no family history of DVT, or pulmonary emboli events.    ROS:   GEN: normal without any fever, night sweats  or weight loss  HEENT: normal with no HA's, sore throat, stiff neck, changes in vision  CV: normal with no CP, SOB, PND, TOURE or orthopnea  PULM: See HPI  GI: normal with no abdominal pain, nausea, vomiting, constipation, diarrhea, melanotic stools, BRBPR, or hematemesis  : normal with no hematuria, dysuria  BREAST: normal with no mass, discharge, pain  SKIN: normal with no rash, erythema, bruising, or swelling     Past Medical History:   Diagnosis Date    Glaucoma     BL    Hypertension     Sleep apnea with use of continuous positive airway pressure (CPAP)     Thyroid disease      Past Surgical History:   Procedure Laterality Date    BREAST SURGERY      lump removed     CHOLECYSTECTOMY      COLONOSCOPY N/A 3/6/2018    Procedure: COLONOSCOPY;  Surgeon: Gamal Shelley MD;  Location: [facility];  Service: Endoscopy;  Laterality: N/A;    COLONOSCOPY N/A 9/18/2018    Procedure: COLONOSCOPY;  Surgeon: Gamal Shelley MD;  Location: [facility];  Service: Endoscopy;  Laterality: N/A;    COLONOSCOPY N/A 9/3/2019    Procedure: COLONOSCOPY;  Surgeon: Gamal Shelley MD;  Location: [facility];  Service: Endoscopy;  Laterality: N/A;    ENDOSCOPIC MUCOSAL RESECTION OF COLON N/A 9/18/2018    Procedure: RESECTION, MUCOSA, COLON, ENDOSCOPIC;  Surgeon: Gaaml Shelley MD;  Location: [facility];  Service: Endoscopy;  Laterality: N/A;  CF SCOPE WITH CLEAR CAP    ENDOSCOPIC MUCOSAL RESECTION OF COLON N/A 9/3/2019    Procedure: RESECTION, MUCOSA, COLON, ENDOSCOPIC;  Surgeon: Gamal Shelley MD;  Location: [facility];  Service: Endoscopy;  Laterality: N/A;  CF with clear cap       Review of patient's allergies indicates:   Allergen Reactions    Depo-medrol [methylprednisolone acetate] Other (See Comments)     Swelling in throat after mixture of dep-medrol and lidocaine IM injection    Lidocaine Other (See Comments)     Swelling in throat after mixture of dep-medrol and lidocaine IM injection    Methylprednisolone  Other (See Comments)     Swelling in throat after mixture of dep-medrol and lidocaine IM injection     Social History     Socioeconomic History    Marital status:    Tobacco Use    Smoking status: Never    Smokeless tobacco: Never   Substance and Sexual Activity    Alcohol use: No         Current Outpatient Medications:     ascorbic acid, vitamin C, 550 mg/1.1 gram (scoop) Powd, Take by mouth., Disp: , Rfl:     bimatoprost (LUMIGAN) 0.01 % Drop, Apply 1 drop to eye every evening., Disp: , Rfl:     ELIQUIS 5 mg Tab, Take 5 mg by mouth 2 (two) times a day., Disp: , Rfl:     ergocalciferol (ERGOCALCIFEROL) 50,000 unit Cap, Take 2,000 Units by mouth every 7 days., Disp: , Rfl:     ergocalciferol (ERGOCALCIFEROL) 50,000 unit Cap, Take 50,000 Units by mouth., Disp: , Rfl:     levothyroxine (SYNTHROID) 88 MCG tablet, TAKE 1 TABLET EVERY MORNING ONE HOUR BEFORE MEAL AND OTHER MEDICATIONS., Disp: 90 tablet, Rfl: 3    LUMIGAN 0.01 % Drop, , Disp: , Rfl:     metoprolol succinate (TOPROL-XL) 50 MG 24 hr tablet, Take 1 tablet (50 mg total) by mouth every evening., Disp: 90 tablet, Rfl: 3    omeprazole (PRILOSEC) 40 MG capsule, Take 1 capsule (40 mg total) by mouth once daily., Disp: 90 capsule, Rfl: 3    valsartan-hydrochlorothiazide (DIOVAN-HCT) 320-25 mg per tablet, Take 1 tablet by mouth once daily., Disp: 90 tablet, Rfl: 3          Objective:   Vitals:  Blood pressure (!) 140/79, pulse 63, temperature 97.9 °F (36.6 °C), resp. rate 16, weight 107.2 kg (236 lb 6.4 oz).    Physical Examination:   GEN: no apparent distress, comfortable  HEAD: atraumatic and normocephalic  EYES: no pallor, no icterus  ENT: no pharyngeal erythema, external ears WNL; no nasal discharge  NECK: no masses, thyroid normal, trachea midline, no LAD/LN's, supple  CV: RRR with no murmur; normal pulse  CHEST: Normal respiratory effort; CTAB; normal breath sounds; no wheeze or crackles  ABDOM: nontender and nondistended; soft;  no rebound/guarding,  "L/S NP  MUSC/Skeletal: ROM normal; no crepitus; joints normal  EXTREM:  Right leg is nontender without palpable venous cord but does have 1+ pitting edema.  Left leg is nontender without palpable venous cord, without pitting edema.  Venous stasis changes not seen at the left or right leg.  SKIN: no rashes, lesions, ulcers, petechiae   : no CVAT  NEURO: grossly intact; motor/sensory WNL;  no tremors  PSYCH: normal mood, affect and behavior  LYMPH: normal cervical, supraclavicular, axillary LN's  BREASTS:she left bra on for exam.      Labs:   No results found for: "WBC", "HGB", "HCT", "MCV", "PLT" CMP  Sodium   Date Value Ref Range Status   02/28/2023 142 136 - 145 mmol/L Final     Potassium   Date Value Ref Range Status   02/28/2023 4.0 3.5 - 5.1 mmol/L Final     Chloride   Date Value Ref Range Status   02/28/2023 104 95 - 110 mmol/L Final     CO2   Date Value Ref Range Status   02/28/2023 27 23 - 29 mmol/L Final     Glucose   Date Value Ref Range Status   02/28/2023 83 70 - 110 mg/dL Final     BUN   Date Value Ref Range Status   02/28/2023 15 8 - 23 mg/dL Final     Creatinine   Date Value Ref Range Status   02/28/2023 0.8 0.5 - 1.4 mg/dL Final     Calcium   Date Value Ref Range Status   02/28/2023 9.4 8.7 - 10.5 mg/dL Final     Anion Gap   Date Value Ref Range Status   02/28/2023 11 8 - 16 mmol/L Final     Recent CBC, CMP, TSH unremarkable.    12/4/23 mamm negative for malignancy    CTA multiple bilateral pulmonary emboli with right heart strain.    U/S thrombosis seen in superficial femoral vein and popliteal vein of the right leg, November 16, 2023    Assessment:   (1) 83 y.o. female with diagnosis of right leg DVT and bilateral multiple pulmonary emboli with right heart strain. 11/16/23.  Presently clinically improved on Eliquis 5 mg p.o. b.i.d..  She will continue Eliquis for 4-6 months.   CT scan of the chest and ultrasound of the right leg will be done in mid 5/2024, to check on the resolution of " clot.    (2) At some point in the future, after pulmonary emboli have resolved and after right leg DVT has resolved, the Eliquis will be interrupted and lab studies to check for possible hypercoagulation syndrome will be done.  At that time depending on results, we will make a decision on stopping or continuing Eliquis as prophylaxis for DVT.    Return to clinic here on 5/20/24.

## 2024-04-01 NOTE — LETTER
April 1, 2024        Cody Stuart MD  [address]  [place] [address]             [facility] - Hematology Oncology  [address]  [place] [address]  Phone: [phone]  Fax: [phone]   Patient: Rose Vasquez   MR Number: [id number]   YOB: 1940   Date of Visit: 4/1/2024       Dear Dr. Stuart:    Thank you for referring Rose Vasquez to me for evaluation. Below are the relevant portions of my assessment and plan of care.            If you have questions, please do not hesitate to call me. I look forward to following Rose along with you.    Sincerely,      DAVION Larson MD           CC    No Recipients

## 2024-07-09 ENCOUNTER — OFFICE VISIT (OUTPATIENT)
Facility: CLINIC | Age: 84
End: 2024-07-09
Payer: MEDICARE

## 2024-07-09 VITALS
SYSTOLIC BLOOD PRESSURE: 116 MMHG | TEMPERATURE: 97 F | RESPIRATION RATE: 20 BRPM | HEIGHT: 60 IN | WEIGHT: 225 LBS | BODY MASS INDEX: 44.17 KG/M2 | HEART RATE: 54 BPM | DIASTOLIC BLOOD PRESSURE: 55 MMHG

## 2024-07-09 DIAGNOSIS — I82.4Y1 ACUTE DEEP VEIN THROMBOSIS (DVT) OF PROXIMAL VEIN OF RIGHT LOWER EXTREMITY: ICD-10-CM

## 2024-07-09 DIAGNOSIS — I26.09 ACUTE PULMONARY EMBOLISM WITH ACUTE COR PULMONALE, UNSPECIFIED PULMONARY EMBOLISM TYPE: Primary | ICD-10-CM

## 2024-07-09 PROCEDURE — 99214 OFFICE O/P EST MOD 30 MIN: CPT | Mod: PBBFAC,PN | Performed by: INTERNAL MEDICINE

## 2024-07-09 PROCEDURE — 99999 PR PBB SHADOW E&M-EST. PATIENT-LVL IV: CPT | Mod: PBBFAC,,, | Performed by: INTERNAL MEDICINE

## 2024-07-09 PROCEDURE — G2211 COMPLEX E/M VISIT ADD ON: HCPCS | Mod: S$PBB,,, | Performed by: INTERNAL MEDICINE

## 2024-07-09 PROCEDURE — 99215 OFFICE O/P EST HI 40 MIN: CPT | Mod: S$PBB,,, | Performed by: INTERNAL MEDICINE

## 2024-07-28 NOTE — PROGRESS NOTES
[facility] [address] hematology Oncology in office Subsequent encounter note     24    Subjective:      Patient ID:   Rose Vasquez  83 y.o. female  1940  MD Josephine Roldan NP      Chief Complaint:   Pulmonary embolus     HPI:  83 y.o. female traveled with her family by automobile in 2023 you very for vacation in [place].  Around 23, she developed pain and swelling in her right leg for approximately 1 week.  She was seen in the emergency room at [facility] and found to have DVT at the right leg and multiple pulmonary emboli.  She was transferred to [facility], treated with heparin infusion and transitioned over to Eliquis.  She is presently on Eliquis 5 mg b.i.d..  She denies shortness of breath or chest pain symptoms or pleurisy.  She does have some residual pain and swelling in the right leg now and then.    She does have history of taking COVID vaccinations in , and had COVID infection approximately 1 year ago.    She has glaucoma, hypertension x2 years, sleep apnea syndrome and uses a CPAP machine, she has thyroid disease.    Medications include Synthroid Toprol-XL, Prilosec, Diovan HCT and vitamin-D, vitamin-C    She is allergic to methylprednisolone and or lidocaine.    She does not smoke, she does not drink alcohol.    She is status post left rotator cuff surgery at the shoulder, and status post cholecystectomy.    She is a  4 para 3 miscarriage 1 individual.    Her mother had colon cancer, CVA x1, she had 1 kidney removed.  Her father had lung cancer, diabetes, he was a mattson.  A brother  of unknown cause, another brother had diabetes, hypertension, renal disease.  She had 2 sisters alive and well.  She has 2 daughters alive and well and 1 son with testicular cancer history at age 29 treated with surgery and radiation.  There is no family history of DVT, or pulmonary emboli events.    ROS:   GEN: normal without any fever, night  sweats or weight loss  HEENT: normal with no HA's, sore throat, stiff neck, changes in vision  CV: normal with no CP, SOB, PND, TOURE or orthopnea  PULM: See HPI  GI: normal with no abdominal pain, nausea, vomiting, constipation, diarrhea, melanotic stools, BRBPR, or hematemesis  : normal with no hematuria, dysuria  BREAST: normal with no mass, discharge, pain  SKIN: normal with no rash, erythema, bruising, or swelling     Past Medical History:   Diagnosis Date    Glaucoma     BL    Hypertension     Sleep apnea with use of continuous positive airway pressure (CPAP)     Thyroid disease      Past Surgical History:   Procedure Laterality Date    BREAST SURGERY      lump removed     CHOLECYSTECTOMY      COLONOSCOPY N/A 3/6/2018    Procedure: COLONOSCOPY;  Surgeon: Gamal Shelley MD;  Location: [facility];  Service: Endoscopy;  Laterality: N/A;    COLONOSCOPY N/A 9/18/2018    Procedure: COLONOSCOPY;  Surgeon: Gamal Shelley MD;  Location: [facility];  Service: Endoscopy;  Laterality: N/A;    COLONOSCOPY N/A 9/3/2019    Procedure: COLONOSCOPY;  Surgeon: Gamal Shelley MD;  Location: [facility];  Service: Endoscopy;  Laterality: N/A;    ENDOSCOPIC MUCOSAL RESECTION OF COLON N/A 9/18/2018    Procedure: RESECTION, MUCOSA, COLON, ENDOSCOPIC;  Surgeon: Gamal Shelley MD;  Location: [facility];  Service: Endoscopy;  Laterality: N/A;  CF SCOPE WITH CLEAR CAP    ENDOSCOPIC MUCOSAL RESECTION OF COLON N/A 9/3/2019    Procedure: RESECTION, MUCOSA, COLON, ENDOSCOPIC;  Surgeon: Gamal Shelley MD;  Location: [facility];  Service: Endoscopy;  Laterality: N/A;  CF with clear cap       Review of patient's allergies indicates:   Allergen Reactions    Depo-medrol [methylprednisolone acetate] Other (See Comments)     Swelling in throat after mixture of dep-medrol and lidocaine IM injection    Lidocaine Other (See Comments)     Swelling in throat after mixture of dep-medrol and lidocaine IM injection     Methylprednisolone Other (See Comments)     Swelling in throat after mixture of dep-medrol and lidocaine IM injection     Social History     Socioeconomic History    Marital status:    Tobacco Use    Smoking status: Never    Smokeless tobacco: Never   Substance and Sexual Activity    Alcohol use: No     Social Determinants of Health     Financial Resource Strain: Low Risk  (6/10/2024)    Received from [facility]    Overall Financial Resource Strain (CARDIA)     Difficulty of Paying Living Expenses: Not hard at all   Food Insecurity: No Food Insecurity (6/10/2024)    Received from [facility]    Hunger Vital Sign     Worried About Running Out of Food in the Last Year: Never true     Ran Out of Food in the Last Year: Never true   Transportation Needs: No Transportation Needs (6/10/2024)    Received from [facility]    PRAPARE - Transportation     Lack of Transportation (Medical): No     Lack of Transportation (Non-Medical): No   Physical Activity: Inactive (6/10/2024)    Received from [facility]    Exercise Vital Sign     Days of Exercise per Week: 0 days     Minutes of Exercise per Session: 0 min   Stress: No Stress Concern Present (6/10/2024)    Received from [facility]    [employer] - Occupational Stress Questionnaire     Feeling of Stress : Not at all   Housing Stability: Low Risk  (6/10/2024)    Received from [facility]    Housing Stability Vital Sign     Unable to Pay for Housing in the Last Year: No     Number of Times Moved in the Last Year: 1     Homeless in the Last Year: No         Current Outpatient Medications:     ascorbic acid, vitamin C, 550 mg/1.1 gram (scoop) Powd, Take by mouth., Disp: , Rfl:     bimatoprost (LUMIGAN) 0.01 % Drop, Apply 1 drop to  eye every evening., Disp: , Rfl:     ergocalciferol (ERGOCALCIFEROL) 50,000 unit Cap, Take 2,000 Units by mouth every 7 days., Disp: , Rfl:     ergocalciferol (ERGOCALCIFEROL) 50,000 unit Cap, Take 50,000 Units by mouth., Disp: , Rfl:     levothyroxine (SYNTHROID) 88 MCG tablet, TAKE 1 TABLET EVERY MORNING ONE HOUR BEFORE MEAL AND OTHER MEDICATIONS., Disp: 90 tablet, Rfl: 3    LUMIGAN 0.01 % Drop, , Disp: , Rfl:     metoprolol succinate (TOPROL-XL) 50 MG 24 hr tablet, Take 1 tablet (50 mg total) by mouth every evening., Disp: 90 tablet, Rfl: 3    omeprazole (PRILOSEC) 40 MG capsule, Take 1 capsule (40 mg total) by mouth once daily., Disp: 90 capsule, Rfl: 3    valsartan-hydrochlorothiazide (DIOVAN-HCT) 320-25 mg per tablet, Take 1 tablet by mouth once daily., Disp: 90 tablet, Rfl: 3    apixaban (ELIQUIS) 5 mg Tab, Take 1 tablet (5 mg total) by mouth 2 (two) times a day., Disp: 180 tablet, Rfl: 4          Objective:   Vitals:  Blood pressure (!) 116/55, pulse (!) 54, temperature 97.2 °F (36.2 °C), resp. rate 20, height 5' (1.524 m), weight 102.1 kg (225 lb).    Physical Examination:   GEN: no apparent distress, comfortable  HEAD: atraumatic and normocephalic  EYES: no pallor, no icterus  ENT: no pharyngeal erythema, external ears WNL; no nasal discharge  NECK: no masses, thyroid normal, trachea midline, no LAD/LN's, supple  CV: RRR with no murmur; normal pulse  CHEST: Normal respiratory effort; CTAB; normal breath sounds; no wheeze or crackles  ABDOM: nontender and nondistended; soft;  no rebound/guarding, L/S NP  MUSC/Skeletal: ROM normal; no crepitus; joints normal  EXTREM:  Right leg is nontender without palpable venous cord but does have 1+ pitting edema.  Left leg is nontender without palpable venous cord, without pitting edema.  Venous stasis changes not seen at the left or right leg.  SKIN: no rashes, lesions, ulcers, petechiae   : no CVAT  NEURO: grossly intact; motor/sensory WNL;  no tremors  PSYCH: normal  "mood, affect and behavior  LYMPH: normal cervical, supraclavicular, axillary LN's  BREASTS:she left bra on for exam.      Labs:   No results found for: "WBC", "HGB", "HCT", "MCV", "PLT" CMP  Sodium   Date Value Ref Range Status   02/28/2023 142 136 - 145 mmol/L Final     Potassium   Date Value Ref Range Status   02/28/2023 4.0 3.5 - 5.1 mmol/L Final     Chloride   Date Value Ref Range Status   02/28/2023 104 95 - 110 mmol/L Final     CO2   Date Value Ref Range Status   02/28/2023 27 23 - 29 mmol/L Final     Glucose   Date Value Ref Range Status   02/28/2023 83 70 - 110 mg/dL Final     BUN   Date Value Ref Range Status   02/28/2023 15 8 - 23 mg/dL Final     Creatinine   Date Value Ref Range Status   02/28/2023 0.8 0.5 - 1.4 mg/dL Final     Calcium   Date Value Ref Range Status   02/28/2023 9.4 8.7 - 10.5 mg/dL Final     Anion Gap   Date Value Ref Range Status   02/28/2023 11 8 - 16 mmol/L Final     Recent CBC, CMP, TSH unremarkable.    12/4/23 mamm negative for malignancy    CTA multiple bilateral pulmonary emboli with right heart strain.    U/S thrombosis seen in superficial femoral vein and popliteal vein of the right leg, November 16, 2023    Colonoscopy Dr. Marquez polyp removed.    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 female with diagnosis of right leg DVT and bilateral multiple pulmonary emboli with right heart strain. 11/16/23.  Presently clinically improved on Eliquis 5 mg p.o. b.i.d..  She will continue Eliquis for 4-6 months.   CT scan of the chest PE resolved, webbing seen.    (2) At some point in the future, after right leg DVT has resolved, the Eliquis will be interrupted and lab studies to check for possible hypercoagulation syndrome will be done.  At that time depending on results, we will make a decision on stopping or continuing Eliquis as prophylaxis for DVT.    (3)Check R leg U/S 1/2025.  RTC 1/2025.             "

## 2024-12-09 DIAGNOSIS — Z12.31 OTHER SCREENING MAMMOGRAM: Primary | ICD-10-CM

## 2024-12-13 ENCOUNTER — HOSPITAL ENCOUNTER (OUTPATIENT)
Dept: RADIOLOGY | Facility: HOSPITAL | Age: 84
Discharge: HOME | End: 2024-12-13
Attending: INTERNAL MEDICINE
Payer: MEDICARE

## 2024-12-13 VITALS — HEIGHT: 60 IN | BODY MASS INDEX: 44.17 KG/M2 | WEIGHT: 225 LBS

## 2024-12-13 DIAGNOSIS — Z12.31 OTHER SCREENING MAMMOGRAM: ICD-10-CM

## 2024-12-13 PROCEDURE — 77063 BREAST TOMOSYNTHESIS BI: CPT | Mod: 26,,, | Performed by: RADIOLOGY

## 2024-12-13 PROCEDURE — 77063 BREAST TOMOSYNTHESIS BI: CPT | Mod: TC,PO

## 2024-12-13 PROCEDURE — 77067 SCR MAMMO BI INCL CAD: CPT | Mod: 26,,, | Performed by: RADIOLOGY

## 2025-01-06 ENCOUNTER — TELEPHONE (OUTPATIENT)
Facility: CLINIC | Age: 85
End: 2025-01-06
Payer: MEDICARE

## 2025-01-06 NOTE — TELEPHONE ENCOUNTER
Spoke with patient's daughter and she states she would like the order for US Lower Extremity that was ordered 7/28 sent over to [facility].

## 2025-02-25 RX ORDER — ONDANSETRON 4 MG/1
TABLET, ORALLY DISINTEGRATING ORAL
COMMUNITY
Start: 2024-04-02

## 2025-02-25 RX ORDER — METHYLPREDNISOLONE 4 MG/1
TABLET ORAL
COMMUNITY
Start: 2024-12-20

## 2025-02-25 RX ORDER — ACETAMINOPHEN 500 MG
1 TABLET ORAL DAILY
COMMUNITY

## 2025-02-25 RX ORDER — SIMETHICONE 80 MG
80 TABLET,CHEWABLE ORAL EVERY 6 HOURS PRN
COMMUNITY
Start: 2024-04-03 | End: 2025-04-03

## 2025-02-26 ENCOUNTER — OFFICE VISIT (OUTPATIENT)
Dept: PULMONOLOGY | Facility: CLINIC | Age: 85
End: 2025-02-26
Payer: MEDICARE

## 2025-02-26 VITALS
SYSTOLIC BLOOD PRESSURE: 136 MMHG | HEIGHT: 60 IN | DIASTOLIC BLOOD PRESSURE: 82 MMHG | BODY MASS INDEX: 44.11 KG/M2 | HEART RATE: 65 BPM | OXYGEN SATURATION: 96 % | WEIGHT: 224.69 LBS

## 2025-02-26 DIAGNOSIS — G47.33 OBSTRUCTIVE SLEEP APNEA SYNDROME: ICD-10-CM

## 2025-02-26 DIAGNOSIS — E66.01 CLASS 3 SEVERE OBESITY DUE TO EXCESS CALORIES WITH SERIOUS COMORBIDITY AND BODY MASS INDEX (BMI) OF 40.0 TO 44.9 IN ADULT: Primary | ICD-10-CM

## 2025-02-26 DIAGNOSIS — E66.813 CLASS 3 SEVERE OBESITY DUE TO EXCESS CALORIES WITH SERIOUS COMORBIDITY AND BODY MASS INDEX (BMI) OF 40.0 TO 44.9 IN ADULT: Primary | ICD-10-CM

## 2025-02-26 DIAGNOSIS — I26.99 OTHER PULMONARY EMBOLISM WITHOUT ACUTE COR PULMONALE, UNSPECIFIED CHRONICITY: ICD-10-CM

## 2025-02-26 PROCEDURE — 99203 OFFICE O/P NEW LOW 30 MIN: CPT | Mod: S$GLB,,, | Performed by: INTERNAL MEDICINE

## 2025-02-26 NOTE — PROGRESS NOTES
New Office Visit/Consultation Note    Patient Name: Rose Vasquez  MRN: [id number]  : 1940      Reason for visit: Sleep apnea, DVT/PE    HPI:     2025 - here to establish care, for,irais pt of Dr Lubin - + ANGELA on CPAP - reports good compliance and benefit (AHI 31/77 auto CPAP 10-14), she also has a ho DVT and PE, repeat CTA chest 2024 - showed a web in the arteery, recent US RLE showed no cot.  Overall she is doing well.      Past Medical History    Past Medical History:   Diagnosis Date    Glaucoma     BL    Hypertension     Sleep apnea with use of continuous positive airway pressure (CPAP)     Thyroid disease        Past Surgical History    Past Surgical History:   Procedure Laterality Date    BREAST SURGERY      lump removed     CHOLECYSTECTOMY      COLONOSCOPY N/A 3/6/2018    Procedure: COLONOSCOPY;  Surgeon: Gamal Shelley MD;  Location: [facility];  Service: Endoscopy;  Laterality: N/A;    COLONOSCOPY N/A 2018    Procedure: COLONOSCOPY;  Surgeon: Gamal Shelley MD;  Location: [facility];  Service: Endoscopy;  Laterality: N/A;    COLONOSCOPY N/A 9/3/2019    Procedure: COLONOSCOPY;  Surgeon: Gamal Shelley MD;  Location: [facility];  Service: Endoscopy;  Laterality: N/A;    ENDOSCOPIC MUCOSAL RESECTION OF COLON N/A 2018    Procedure: RESECTION, MUCOSA, COLON, ENDOSCOPIC;  Surgeon: Gamal Shelley MD;  Location: [facility];  Service: Endoscopy;  Laterality: N/A;  CF SCOPE WITH CLEAR CAP    ENDOSCOPIC MUCOSAL RESECTION OF COLON N/A 9/3/2019    Procedure: RESECTION, MUCOSA, COLON, ENDOSCOPIC;  Surgeon: Gamal Shelley MD;  Location: [facility];  Service: Endoscopy;  Laterality: N/A;  CF with clear cap       Medications    Current Medications[1]    Allergies    Review of patient's allergies indicates:   Allergen Reactions    Depo-medrol [methylprednisolone acetate] Other (See Comments)     Swelling in throat after mixture of dep-medrol and lidocaine IM injection     Lidocaine Other (See Comments)     Swelling in throat after mixture of dep-medrol and lidocaine IM injection    Methylprednisolone Other (See Comments)     Swelling in throat after mixture of dep-medrol and lidocaine IM injection       SocHx    Tobacco Use History[2]    Social History     Substance and Sexual Activity   Alcohol Use No         FMHx    Family History   Problem Relation Name Age of Onset    Colon cancer Mother           Review of Systems  Review of Systems   Constitutional:  Negative for chills, diaphoresis, fever, malaise/fatigue and weight loss.   HENT:  Negative for congestion.    Eyes:  Negative for pain.   Respiratory:  Positive for shortness of breath. Negative for cough, hemoptysis, sputum production, wheezing and stridor.    Cardiovascular:  Positive for leg swelling. Negative for chest pain, palpitations, orthopnea, claudication and PND.   Gastrointestinal:  Negative for abdominal pain, constipation, diarrhea, heartburn, nausea and vomiting.   Genitourinary:  Negative for dysuria, frequency and urgency.   Musculoskeletal:  Positive for back pain. Negative for falls and myalgias.   Neurological:  Negative for sensory change, focal weakness and weakness.   Psychiatric/Behavioral:  Negative for depression and suicidal ideas. The patient is not nervous/anxious.        Physical Exam    Vitals:    02/26/25 0932   BP: 136/82   BP Location: Left arm   Patient Position: Sitting   Pulse: 65   SpO2: 96%   Weight: 101.9 kg (224 lb 11.2 oz)   Height: 5' (1.524 m)       Physical Exam  Vitals and nursing note reviewed.   Constitutional:       General: She is not in acute distress.     Appearance: Normal appearance. She is well-developed. She is obese. She is not ill-appearing, toxic-appearing or diaphoretic.   HENT:      Head: Normocephalic and atraumatic.      Right Ear: External ear normal.      Left Ear: External ear normal.      Nose: Nose normal.      Mouth/Throat:      Mouth: Mucous membranes are  "moist.      Pharynx: Oropharynx is clear. No oropharyngeal exudate or posterior oropharyngeal erythema.   Eyes:      General: No scleral icterus.        Right eye: No discharge.         Left eye: No discharge.      Extraocular Movements: Extraocular movements intact.      Conjunctiva/sclera: Conjunctivae normal.      Pupils: Pupils are equal, round, and reactive to light.   Neck:      Thyroid: No thyromegaly.      Vascular: No JVD.      Trachea: No tracheal deviation.   Cardiovascular:      Rate and Rhythm: Normal rate and regular rhythm.      Heart sounds: Normal heart sounds. No murmur heard.     No friction rub. No gallop.   Pulmonary:      Effort: Pulmonary effort is normal. No respiratory distress.      Breath sounds: Normal breath sounds. No stridor. No wheezing, rhonchi or rales.   Chest:      Chest wall: No tenderness.   Abdominal:      General: Bowel sounds are normal. There is no distension.      Palpations: Abdomen is soft.      Tenderness: There is no abdominal tenderness. There is no guarding.   Musculoskeletal:         General: No tenderness. Normal range of motion.      Cervical back: Normal range of motion and neck supple. No rigidity.      Right lower leg: No edema.      Left lower leg: No edema.   Lymphadenopathy:      Cervical: No cervical adenopathy.   Skin:     General: Skin is warm and dry.   Neurological:      General: No focal deficit present.      Mental Status: She is alert and oriented to person, place, and time. Mental status is at baseline.      Cranial Nerves: No cranial nerve deficit.      Motor: No weakness.      Gait: Gait normal.   Psychiatric:         Mood and Affect: Mood normal.         Behavior: Behavior normal.         Thought Content: Thought content normal.         Judgment: Judgment normal.         Labs    No results found for: "WBC", "HGB", "HCT", "PLT"    Sodium   Date Value Ref Range Status   02/28/2023 142 136 - 145 mmol/L Final     Potassium   Date Value Ref Range " Status   02/28/2023 4.0 3.5 - 5.1 mmol/L Final     Chloride   Date Value Ref Range Status   02/28/2023 104 95 - 110 mmol/L Final     CO2   Date Value Ref Range Status   02/28/2023 27 23 - 29 mmol/L Final     Glucose   Date Value Ref Range Status   02/28/2023 83 70 - 110 mg/dL Final     BUN   Date Value Ref Range Status   02/28/2023 15 8 - 23 mg/dL Final     Creatinine   Date Value Ref Range Status   02/28/2023 0.8 0.5 - 1.4 mg/dL Final     Calcium   Date Value Ref Range Status   02/28/2023 9.4 8.7 - 10.5 mg/dL Final     Anion Gap   Date Value Ref Range Status   02/28/2023 11 8 - 16 mmol/L Final       Xrays        Impression/Plan    Problem List Items Addressed This Visit          Hematology    Other pulmonary embolism without acute cor pulmonale    Has residual web on CTA  Last US was OK  She remains a high risk and I would recommend continuing anticoagulation due to relative immobility            Endocrine    Class 3 severe obesity due to excess calories with serious comorbidity and body mass index (BMI) of 40.0 to 44.9 in adult - Primary    We did discuss working on weight loss            Other    Obstructive sleep apnea syndrome    Continue present PAP therapy  Pt to call if they have any trouble with their machines  Discussed with pt about signs and symptoms to watch for  Will refill supplies as needed  Have encouraged pt to continue to work on diet, weight loss and exercise  RTC 6 month              I have spent about 30 minutes with the patient taking the history and examining the patient.  We have discussed the diagnoses and current plan and all questions have been answered.  We have discussed the follow up plan.  The patient and family (if present) know to contact the office with any questions they may have.        Joe Webb MD         [1]   Current Outpatient Medications:     apixaban (ELIQUIS) 5 mg Tab, Take 1 tablet (5 mg total) by mouth 2 (two) times a day., Disp: 180 tablet, Rfl: 4     bimatoprost (LUMIGAN) 0.01 % Drop, Apply 1 drop to eye every evening., Disp: , Rfl:     cholecalciferol, vitamin D3, 125 mcg (5,000 unit) Tab, Take 1 tablet by mouth once daily., Disp: , Rfl:     ergocalciferol (ERGOCALCIFEROL) 50,000 unit Cap, Take 2,000 Units by mouth every 7 days., Disp: , Rfl:     ergocalciferol (ERGOCALCIFEROL) 50,000 unit Cap, Take 50,000 Units by mouth., Disp: , Rfl:     levothyroxine (SYNTHROID) 88 MCG tablet, TAKE 1 TABLET EVERY MORNING ONE HOUR BEFORE MEAL AND OTHER MEDICATIONS., Disp: 90 tablet, Rfl: 3    LUMIGAN 0.01 % Drop, , Disp: , Rfl:     metoprolol succinate (TOPROL-XL) 50 MG 24 hr tablet, Take 1 tablet (50 mg total) by mouth every evening., Disp: 90 tablet, Rfl: 3    omeprazole (PRILOSEC) 40 MG capsule, Take 1 capsule (40 mg total) by mouth once daily., Disp: 90 capsule, Rfl: 3    ondansetron (ZOFRAN-ODT) 4 MG TbDL, , Disp: , Rfl:     simethicone (MYLICON) 80 MG chewable tablet, Take 80 mg by mouth every 6 (six) hours as needed., Disp: , Rfl:     valsartan-hydrochlorothiazide (DIOVAN-HCT) 320-25 mg per tablet, Take 1 tablet by mouth once daily., Disp: 90 tablet, Rfl: 3    ascorbic acid, vitamin C, 550 mg/1.1 gram (scoop) Powd, Take by mouth. (Patient not taking: Reported on 2/26/2025), Disp: , Rfl:     methylPREDNISolone (MEDROL DOSEPACK) 4 mg tablet, Take by mouth. (Patient not taking: Reported on 2/26/2025), Disp: , Rfl:   [2]   Social History  Tobacco Use   Smoking Status Never   Smokeless Tobacco Never

## 2025-02-26 NOTE — ASSESSMENT & PLAN NOTE
Has residual web on CTA  Last US was OK  She remains a high risk and I would recommend continuing anticoagulation due to relative immobility

## 2025-02-26 NOTE — ASSESSMENT & PLAN NOTE
Continue present PAP therapy  Pt to call if they have any trouble with their machines  Discussed with pt about signs and symptoms to watch for  Will refill supplies as needed  Have encouraged pt to continue to work on diet, weight loss and exercise  RTC 6 month

## 2025-08-26 ENCOUNTER — OFFICE VISIT (OUTPATIENT)
Dept: PULMONOLOGY | Facility: CLINIC | Age: 85
End: 2025-08-26
Payer: MEDICARE

## 2025-08-26 VITALS — OXYGEN SATURATION: 96 % | WEIGHT: 223.63 LBS | HEART RATE: 73 BPM | BODY MASS INDEX: 43.67 KG/M2

## 2025-08-26 DIAGNOSIS — I26.99 OTHER PULMONARY EMBOLISM WITHOUT ACUTE COR PULMONALE, UNSPECIFIED CHRONICITY: Primary | ICD-10-CM

## 2025-08-26 DIAGNOSIS — R06.02 SHORTNESS OF BREATH: ICD-10-CM

## 2025-08-26 DIAGNOSIS — G47.33 OBSTRUCTIVE SLEEP APNEA SYNDROME: ICD-10-CM

## 2025-08-26 PROCEDURE — 99214 OFFICE O/P EST MOD 30 MIN: CPT | Mod: S$GLB,,, | Performed by: INTERNAL MEDICINE

## 2025-08-26 RX ORDER — COLESEVELAM 180 1/1
625 TABLET ORAL 2 TIMES DAILY
COMMUNITY
Start: 2025-08-18